# Patient Record
Sex: MALE | Race: WHITE | NOT HISPANIC OR LATINO | Employment: FULL TIME | URBAN - METROPOLITAN AREA
[De-identification: names, ages, dates, MRNs, and addresses within clinical notes are randomized per-mention and may not be internally consistent; named-entity substitution may affect disease eponyms.]

---

## 2021-10-22 ENCOUNTER — PATIENT ROUNDING (BHMG ONLY) (OUTPATIENT)
Dept: FAMILY MEDICINE CLINIC | Facility: CLINIC | Age: 36
End: 2021-10-22

## 2021-10-22 ENCOUNTER — OFFICE VISIT (OUTPATIENT)
Dept: FAMILY MEDICINE CLINIC | Facility: CLINIC | Age: 36
End: 2021-10-22

## 2021-10-22 VITALS
SYSTOLIC BLOOD PRESSURE: 156 MMHG | TEMPERATURE: 95.7 F | RESPIRATION RATE: 18 BRPM | DIASTOLIC BLOOD PRESSURE: 98 MMHG | HEIGHT: 72 IN | OXYGEN SATURATION: 99 % | HEART RATE: 98 BPM | BODY MASS INDEX: 42.66 KG/M2 | WEIGHT: 315 LBS

## 2021-10-22 DIAGNOSIS — Z79.899 MEDICATION MANAGEMENT: ICD-10-CM

## 2021-10-22 DIAGNOSIS — Z13.220 SCREENING FOR LIPOID DISORDERS: ICD-10-CM

## 2021-10-22 DIAGNOSIS — Z13.29 SCREENING FOR THYROID DISORDER: ICD-10-CM

## 2021-10-22 DIAGNOSIS — E66.01 MORBID OBESITY WITH BMI OF 45.0-49.9, ADULT (HCC): Primary | ICD-10-CM

## 2021-10-22 PROCEDURE — 99204 OFFICE O/P NEW MOD 45 MIN: CPT | Performed by: NURSE PRACTITIONER

## 2021-10-22 RX ORDER — LISINOPRIL 10 MG/1
10 TABLET ORAL DAILY
Qty: 30 TABLET | Refills: 1 | Status: SHIPPED | OUTPATIENT
Start: 2021-10-22

## 2021-10-22 RX ORDER — TRIAMCINOLONE ACETONIDE 0.25 MG/G
1 OINTMENT TOPICAL 2 TIMES DAILY
Qty: 15 G | Refills: 0 | Status: SHIPPED | OUTPATIENT
Start: 2021-10-22 | End: 2022-01-04

## 2021-10-22 NOTE — PROGRESS NOTES
"Chief Complaint  Establish Care, Weight Loss, Mass (abdomin area right side for a couple of years now), and Rash (spots on neck and shoulders)    Subjective          James Kemp presents to [facility]  James presents to [facility]. He is interested in blood work today.     Has a spot on his neck and his upper back  He does report a bump on his stomach    Weight loss, interested in options.     Weight Loss  Associated symptoms include a rash.   Rash        Objective   Vital Signs:   /98   Pulse 98   Temp 95.7 °F (35.4 °C) (Infrared)   Resp 18   Ht 182.9 cm (72\")   Wt (!) 161 kg (354 lb 1.6 oz)   SpO2 99%   BMI 48.02 kg/m²     Physical Exam  Vitals reviewed.   Constitutional:       Appearance: Normal appearance. He is obese.   HENT:      Head: Normocephalic and atraumatic.      Right Ear: Tympanic membrane and ear canal normal.      Left Ear: Tympanic membrane and ear canal normal.      Nose: Nose normal.      Mouth/Throat:      Mouth: Mucous membranes are moist.      Pharynx: Oropharynx is clear.   Eyes:      General:         Right eye: Discharge present.      Pupils: Pupils are equal, round, and reactive to light.   Cardiovascular:      Rate and Rhythm: Normal rate and regular rhythm.      Pulses: Normal pulses.      Heart sounds: Normal heart sounds. No murmur heard.  No friction rub. No gallop.    Pulmonary:      Effort: Pulmonary effort is normal. No respiratory distress.      Breath sounds: Normal breath sounds. No wheezing, rhonchi or rales.   Abdominal:      General: Bowel sounds are normal. There is no distension.      Palpations: Abdomen is soft. There is no mass.      Tenderness: There is no abdominal tenderness.      Hernia: A hernia is present.   Skin:     General: Skin is warm and dry.   Neurological:      Mental Status: He is alert and oriented to person, place, and time.   Psychiatric:         Mood and Affect: Mood normal.        Result Review :        "          Assessment and Plan    There are no diagnoses linked to this encounter.    Follow Up   No follow-ups on file.  Patient was given instructions and counseling regarding his condition or for health maintenance advice. Please see specific information pulled into the AVS if appropriate.     Patient is establishing care    Obesity: discussed weight loss options including medications, diet/exercise and surgical options. He has tried diet/exercise and feels he need something to help with weight loss to get back on track. It is causing strain in his relationship, he is currently  but hoping to reconcile.   Addendum: A1C is in prediabetic range, will try to get ozempic covered for once weekly injections to manage glucose as well as assist with weight, medication discussed extensively with patient during visit as well as risks. He is agreeable to proceed.     Hernia: not causing pain currently, recommend general surgery follow up for continued/worsening symptoms, only palpable with abdominal tension, not palpable with rest, he would like to monitor at this time    Weight: he is currently  and he is trying to work on himself to better manage his health. He feels at this point, he needs help to get on track to lose weight. He is unable to exercise much due to weight. He has two children and also wants to get healthy for them.     HTN: bp is elevaetd at 156/98, discussed sleep apnea, he has a machine and mask but states difficult to tolerate but he will get a new mask and start using this nightly, advised untreated sleep apnea can cause HTN and weight gain, as well as cardiac issues including enlarged heart and chf. He is agreeable to start low dose lisinopril, advised this can be discontinued with weight loss, but at his level, feel it is appropriate to start treatment currently, he agrees    Rash: triamcinolone prescribed, he does have suspicious lesions on his neck and back, recommend derm follow up,  he will self schedule    Follow up in 6 weeks, we can evaluate medication changes and repeat labs as needed, especially for weight management to ensure he is doing well on medication

## 2021-10-23 LAB
ALBUMIN SERPL-MCNC: 4.7 G/DL (ref 4–5)
ALBUMIN/GLOB SERPL: 1.4 {RATIO} (ref 1.2–2.2)
ALP SERPL-CCNC: 73 IU/L (ref 44–121)
ALT SERPL-CCNC: 65 IU/L (ref 0–44)
AST SERPL-CCNC: 47 IU/L (ref 0–40)
BASOPHILS # BLD AUTO: 0 X10E3/UL (ref 0–0.2)
BASOPHILS NFR BLD AUTO: 0 %
BILIRUB SERPL-MCNC: 0.5 MG/DL (ref 0–1.2)
BUN SERPL-MCNC: 18 MG/DL (ref 6–20)
BUN/CREAT SERPL: 21 (ref 9–20)
CALCIUM SERPL-MCNC: 9.9 MG/DL (ref 8.7–10.2)
CHLORIDE SERPL-SCNC: 101 MMOL/L (ref 96–106)
CHOLEST SERPL-MCNC: 191 MG/DL (ref 100–199)
CO2 SERPL-SCNC: 24 MMOL/L (ref 20–29)
CREAT SERPL-MCNC: 0.87 MG/DL (ref 0.76–1.27)
EOSINOPHIL # BLD AUTO: 0.1 X10E3/UL (ref 0–0.4)
EOSINOPHIL NFR BLD AUTO: 1 %
ERYTHROCYTE [DISTWIDTH] IN BLOOD BY AUTOMATED COUNT: 13.6 % (ref 11.6–15.4)
GLOBULIN SER CALC-MCNC: 3.3 G/DL (ref 1.5–4.5)
GLUCOSE SERPL-MCNC: 115 MG/DL (ref 65–99)
HBA1C MFR BLD: 6.1 % (ref 4.8–5.6)
HCT VFR BLD AUTO: 43 % (ref 37.5–51)
HDLC SERPL-MCNC: 43 MG/DL
HGB BLD-MCNC: 14 G/DL (ref 13–17.7)
IMM GRANULOCYTES # BLD AUTO: 0 X10E3/UL (ref 0–0.1)
IMM GRANULOCYTES NFR BLD AUTO: 0 %
LDLC SERPL CALC-MCNC: 122 MG/DL (ref 0–99)
LDLC/HDLC SERPL: 2.8 RATIO (ref 0–3.6)
LYMPHOCYTES # BLD AUTO: 1.4 X10E3/UL (ref 0.7–3.1)
LYMPHOCYTES NFR BLD AUTO: 12 %
MCH RBC QN AUTO: 27.3 PG (ref 26.6–33)
MCHC RBC AUTO-ENTMCNC: 32.6 G/DL (ref 31.5–35.7)
MCV RBC AUTO: 84 FL (ref 79–97)
MONOCYTES # BLD AUTO: 1.1 X10E3/UL (ref 0.1–0.9)
MONOCYTES NFR BLD AUTO: 9 %
NEUTROPHILS # BLD AUTO: 9.4 X10E3/UL (ref 1.4–7)
NEUTROPHILS NFR BLD AUTO: 78 %
PLATELET # BLD AUTO: 332 X10E3/UL (ref 150–450)
POTASSIUM SERPL-SCNC: 4.3 MMOL/L (ref 3.5–5.2)
PROT SERPL-MCNC: 8 G/DL (ref 6–8.5)
RBC # BLD AUTO: 5.13 X10E6/UL (ref 4.14–5.8)
SODIUM SERPL-SCNC: 140 MMOL/L (ref 134–144)
TRIGL SERPL-MCNC: 147 MG/DL (ref 0–149)
TSH SERPL DL<=0.005 MIU/L-ACNC: 1.42 UIU/ML (ref 0.45–4.5)
VLDLC SERPL CALC-MCNC: 26 MG/DL (ref 5–40)
WBC # BLD AUTO: 12.1 X10E3/UL (ref 3.4–10.8)

## 2021-10-23 RX ORDER — SEMAGLUTIDE 1.34 MG/ML
0.25 INJECTION, SOLUTION SUBCUTANEOUS WEEKLY
Qty: 1 PEN | Refills: 0 | Status: SHIPPED | OUTPATIENT
Start: 2021-10-23 | End: 2021-10-28

## 2021-10-26 ENCOUNTER — TELEPHONE (OUTPATIENT)
Dept: FAMILY MEDICINE CLINIC | Facility: CLINIC | Age: 36
End: 2021-10-26

## 2021-10-28 ENCOUNTER — TELEPHONE (OUTPATIENT)
Dept: FAMILY MEDICINE CLINIC | Facility: CLINIC | Age: 36
End: 2021-10-28

## 2021-10-28 NOTE — TELEPHONE ENCOUNTER
Spoke with pt and he agrees to trying the Saxenda. He would like to speak with you he has some questions to ask you, like side effects, WBC being high will the affect anything and etc. Pt would like for you to call him to go over these types of questions.

## 2021-10-28 NOTE — TELEPHONE ENCOUNTER
Spoke to patient, ozempic was not covered prediabetes. He is agreeable to trial saxenda. Discussed insurance may not cover this medication, and it is expensive. Rx sent, he will look online for  coupon to assist with cost. Needles sent. He does plan to follow up in 6 weeks regardless and to touch base regarding progress. He reports cutting out sweets from his diet.

## 2021-12-07 ENCOUNTER — OFFICE VISIT (OUTPATIENT)
Dept: FAMILY MEDICINE CLINIC | Facility: CLINIC | Age: 36
End: 2021-12-07

## 2021-12-07 VITALS
DIASTOLIC BLOOD PRESSURE: 94 MMHG | HEIGHT: 72 IN | HEART RATE: 102 BPM | SYSTOLIC BLOOD PRESSURE: 140 MMHG | WEIGHT: 315 LBS | RESPIRATION RATE: 18 BRPM | TEMPERATURE: 98.4 F | OXYGEN SATURATION: 97 % | BODY MASS INDEX: 42.66 KG/M2

## 2021-12-07 DIAGNOSIS — K42.9 PERIUMBILICAL HERNIA: Primary | ICD-10-CM

## 2021-12-07 DIAGNOSIS — I10 ESSENTIAL HYPERTENSION: ICD-10-CM

## 2021-12-07 DIAGNOSIS — E66.01 MORBID OBESITY WITH BMI OF 45.0-49.9, ADULT (HCC): ICD-10-CM

## 2021-12-07 PROCEDURE — 99213 OFFICE O/P EST LOW 20 MIN: CPT | Performed by: NURSE PRACTITIONER

## 2021-12-07 NOTE — PROGRESS NOTES
"Chief Complaint  Obesity (6wk FU)    Subjective          James Kemp presents to [facility]  Reported weight is 346 at home  Stopped snacking, stopped binge eating at night  Significantly reduce soda intake, now drinking more water, 2-3 x weekly splurges on soda  He was unable to start saxenda or victoza secondary to cost    Since his last visit, he has been using his cpap nightly and reports improvement in use, he is sleeping well throughout the night.     HTN: bp is improved, not controlled, he is working on diet/exercise, started using the medication daily but stopped after a few weeks.     Obesity  This is a chronic problem. The current episode started more than 1 year ago. The problem occurs constantly. The problem has been gradually improving. Associated symptoms include abdominal pain (related to hernia). Pertinent negatives include no anorexia, arthralgias, change in bowel habit, chest pain, chills, congestion, coughing, diaphoresis, fatigue, fever, headaches, joint swelling, myalgias, nausea, neck pain, numbness, rash, sore throat, swollen glands, urinary symptoms, vertigo, visual change, vomiting or weakness. Nothing aggravates the symptoms. Treatments tried: dietary changes, increased physical activity. The treatment provided moderate relief.       Objective   Vital Signs:   /94   Pulse 102   Temp 98.4 °F (36.9 °C) (Infrared)   Resp 18   Ht 182.9 cm (72\")   Wt (!) 159 kg (350 lb 1.6 oz)   SpO2 97%   BMI 47.48 kg/m²     Physical Exam  Constitutional:       General: He is not in acute distress.     Appearance: He is well-developed. He is not diaphoretic.   Cardiovascular:      Rate and Rhythm: Normal rate and regular rhythm.      Heart sounds: Normal heart sounds. No murmur heard.  No friction rub. No gallop.    Pulmonary:      Effort: Pulmonary effort is normal. No respiratory distress.      Breath sounds: Normal breath sounds. No wheezing or rales. "   Abdominal:      General: Bowel sounds are normal. There is no distension.      Palpations: Abdomen is soft.      Tenderness: There is no abdominal tenderness.      Hernia: A hernia (periumbilical) is present.   Musculoskeletal:      Cervical back: Neck supple.   Skin:     General: Skin is warm and dry.   Neurological:      Mental Status: He is alert and oriented to person, place, and time.        Result Review :                 Assessment and Plan    Diagnoses and all orders for this visit:    1. Periumbilical hernia (Primary)  -     Ambulatory Referral to General Surgery    2. Morbid obesity with BMI of 45.0-49.9, adult (HCC)    3. Essential hypertension        Follow Up   No follow-ups on file.  Patient was given instructions and counseling regarding his condition or for health maintenance advice. Please see specific information pulled into the AVS if appropriate.     Hernia; referral to general surgery, increasing pain in abdomen    Morbid obesity: continue dietary changes, discussed medication options beyond ozempic/saxenda, prefers to continue diet/exercise  Recommend follow up in 8-12 weeks to monitor progress. He is compliant with cpap which will likely give him more energy and has helped his bp    HTN: resume lisinopril,

## 2022-01-03 NOTE — PROGRESS NOTES
Date: 2022   Patient Name: James Kemp   Medical Record #: [id number]   : 1985  Age: 36 y.o.  Sex: male      Referring MD:  Radha Pina, APRN  [address]  [address]  [place] [address]    Reason for Visit  Chief Complaint   Patient presents with   • Hernia     Periumbilical hernia        History of Present Illness:     James Kemp is a 36 y.o. male who presents with a abdominal mass above the umbilicus.   Patient reports the mass is nonreducible, is often uncomfortable but not tender.  Denies recent growth.  Reports no exacerbation of discomfort with activity.  Quality of discomfort can be dull that does not radiate.  No prior history of abdominal surgeries  Patient has history of a pericolonic cyst that was drained by CT-guided in .  He had repeat follow-up CT scan a month after that show decrease of the size of the cyst.  He was supposed to have a follow-up CT scan but this never happened.  He denies any recent change of bowel habits.  He has lost 40 pounds due to exercise and improvement of diet.    Past Medical History  -Morbid obesity    Past Surgical History    Past Surgical History:   Procedure Laterality Date   • FINE NEEDLE ASPIRATION N/A     abdominen, reports pocket   • VASECTOMY N/A     Dr. Yan III   • WISDOM TOOTH EXTRACTION Bilateral        Allergies    No Known Allergies    Medications  Current Outpatient Medications   Medication Sig Dispense Refill   • lisinopril (PRINIVIL,ZESTRIL) 10 MG tablet Take 1 tablet by mouth Daily. (Patient taking differently: Take 10 mg by mouth Daily. Not Compliant) 30 tablet 1     No current facility-administered medications for this visit.         Social History  Social History     Socioeconomic History   • Marital status:    Tobacco Use   • Smoking status: Never Smoker   • Smokeless tobacco: Never Used   Vaping Use   • Vaping Use: Never used   Substance and Sexual Activity   • Alcohol use: Yes      "Alcohol/week: 2.0 standard drinks     Types: 2 Standard drinks or equivalent per week     Comment: social   • Drug use: No   • Sexual activity: Yes     Partners: Female     Comment: wife       Family History  Family History   Problem Relation Age of Onset   • Asthma Mother    • No Known Problems Father    • Heart disease Maternal Grandmother    • Multiple sclerosis Maternal Grandmother    • Heart disease Maternal Grandfather    • Brain cancer Maternal Aunt        REVIEW OF SYSTEMS  CONSTITUTIONAL: Denies fevers, chills, unintentional weight loss or weight gain  RESPIRATORY: Denies chronic cough or sob.   CARDIAC: Denies chest pain, palpitations, edema  GI: Denies dyspepsia, reflux, heartburn, nausea, vomiting, diarrhea or constipation  : Denies dysuria or hematuria.    MUSCULOSKELETAL: Denies muscle weakness and pain   NEURO: Denies chronic headaches.   ENDOCRINE: Denies significant heat or cold intolerance or history of thyroid problems.    DERM: no rashes,lesions or discharge.     Physical Examination  Ht 182.9 cm (72\")   Wt (!) 160 kg (352 lb 9.6 oz)   BMI 47.82 kg/m²   Body mass index is 47.82 kg/m².  GENERAL:alert, well appearing, and in no distress and oriented to person, place, and time  HEENT: normochephalic, atraumatic, moist mucus membranes, no scleral icterus   CHEST: clear to auscultation, no wheezes, rales or rhonchi, symmetric air entry  CARDIAC: regular rate and rhythm    ABDOMEN: soft, nontender, nondistended, no masses or organomegaly.  There is evidence of rectus muscle diastases.  I am not able to appreciate any hernia at the area of concern  EXTREMITIES: no cyanosis, clubbing or edema    NEURO: alert and oriented, normal speech, cranial nerves 2-12 grossly intact, no focal deficits  SKIN: Warm and moist    Medical Decision Making  Test Results:  Results for orders placed or performed in visit on 10/22/21   Lipid Panel With LDL / HDL Ratio    Specimen: Blood   Result Value Ref Range    Total " Cholesterol 191 100 - 199 mg/dL    Triglycerides 147 0 - 149 mg/dL    HDL Cholesterol 43 >39 mg/dL    VLDL Cholesterol Alfred 26 5 - 40 mg/dL    LDL Chol Calc (NIH) 122 (H) 0 - 99 mg/dL    LDL/HDL RATIO 2.8 0.0 - 3.6 ratio   Comprehensive metabolic panel    Specimen: Blood   Result Value Ref Range    Glucose 115 (H) 65 - 99 mg/dL    BUN 18 6 - 20 mg/dL    Creatinine 0.87 0.76 - 1.27 mg/dL    eGFR Non African Am 109 >59 mL/min/1.73    eGFR African Am 127 >59 mL/min/1.73    BUN/Creatinine Ratio 21 (H) 9 - 20    Sodium 140 134 - 144 mmol/L    Potassium 4.3 3.5 - 5.2 mmol/L    Chloride 101 96 - 106 mmol/L    Total CO2 24 20 - 29 mmol/L    Calcium 9.9 8.7 - 10.2 mg/dL    Total Protein 8.0 6.0 - 8.5 g/dL    Albumin 4.7 4.0 - 5.0 g/dL    Globulin 3.3 1.5 - 4.5 g/dL    A/G Ratio 1.4 1.2 - 2.2    Total Bilirubin 0.5 0.0 - 1.2 mg/dL    Alkaline Phosphatase 73 44 - 121 IU/L    AST (SGOT) 47 (H) 0 - 40 IU/L    ALT (SGPT) 65 (H) 0 - 44 IU/L   TSH Rfx On Abnormal To Free T4    Specimen: Blood   Result Value Ref Range    TSH 1.420 0.450 - 4.500 uIU/mL   Hemoglobin A1c    Specimen: Blood   Result Value Ref Range    Hemoglobin A1C 6.1 (H) 4.8 - 5.6 %   CBC & Differential    Specimen: Blood   Result Value Ref Range    WBC 12.1 (H) 3.4 - 10.8 x10E3/uL    RBC 5.13 4.14 - 5.80 x10E6/uL    Hemoglobin 14.0 13.0 - 17.7 g/dL    Hematocrit 43.0 37.5 - 51.0 %    MCV 84 79 - 97 fL    MCH 27.3 26.6 - 33.0 pg    MCHC 32.6 31.5 - 35.7 g/dL    RDW 13.6 11.6 - 15.4 %    Platelets 332 150 - 450 x10E3/uL    Neutrophil Rel % 78 Not Estab. %    Lymphocyte Rel % 12 Not Estab. %    Monocyte Rel % 9 Not Estab. %    Eosinophil Rel % 1 Not Estab. %    Basophil Rel % 0 Not Estab. %    Neutrophils Absolute 9.4 (H) 1.4 - 7.0 x10E3/uL    Lymphocytes Absolute 1.4 0.7 - 3.1 x10E3/uL    Monocytes Absolute 1.1 (H) 0.1 - 0.9 x10E3/uL    Eosinophils Absolute 0.1 0.0 - 0.4 x10E3/uL    Basophils Absolute 0.0 0.0 - 0.2 x10E3/uL    Immature Granulocyte Rel % 0 Not Estab. %     Immature Grans Absolute 0.0 0.0 - 0.1 x10E3/uL     CT scan abdomen pelvis October 31, 2016 there is near complete resolution.  Fluid collection measuring 2.5 cm.    Impression:  This is a 36 y.o. male patient with chief complaint of possible abdominal wall hernia.  On physical exam I am not able to feel any hernia although small hernias can be missed with his body habitus.  He has wide rectus muscle diastases and has high risk of developing epigastric hernias.  He has history of pericolonic cyst that was aspirated.  This was found to be benign.  Discussed with him about the need to have repeat CAT scan abdomen pelvis for further evaluation and ensure resolution.  He understood  He has been losing weight but continues to be morbidly obese.  Discussed with him about the need to continue weight loss with exercise and diet.    Plan:  1.  CT scan abdomen pelvis with IV and p.o. contrast  2.  Encourage weight loss, continue exercising and diet    All questions were answered and the patient was willing to proceed with the above recommendations.        Arslan Aguayo MD  General, Minimally Invasive and Endoscopic Surgery  [facility]    [address] [place] [address], [address]  [place], [place], [address]  P: [phone]  F: [phone]

## 2022-01-04 ENCOUNTER — OFFICE VISIT (OUTPATIENT)
Dept: SURGERY | Facility: CLINIC | Age: 37
End: 2022-01-04

## 2022-01-04 VITALS — WEIGHT: 315 LBS | HEIGHT: 72 IN | BODY MASS INDEX: 42.66 KG/M2

## 2022-01-04 DIAGNOSIS — K63.89 COLONIC MASS: ICD-10-CM

## 2022-01-04 DIAGNOSIS — K43.9 ABDOMINAL WALL HERNIA: Primary | ICD-10-CM

## 2022-01-04 PROCEDURE — 99203 OFFICE O/P NEW LOW 30 MIN: CPT | Performed by: SURGERY

## 2022-01-13 ENCOUNTER — HOSPITAL ENCOUNTER (OUTPATIENT)
Dept: CT IMAGING | Facility: HOSPITAL | Age: 37
Discharge: HOME | End: 2022-01-13
Admitting: SURGERY

## 2022-01-13 LAB — CREAT BLDA-MCNC: 0.9 MG/DL (ref 0.6–1.3)

## 2022-01-13 PROCEDURE — 74177 CT ABD & PELVIS W/CONTRAST: CPT

## 2022-01-13 PROCEDURE — 82565 ASSAY OF CREATININE: CPT

## 2022-01-13 PROCEDURE — 25010000002 IOPAMIDOL 61 % SOLUTION: Performed by: SURGERY

## 2022-01-13 PROCEDURE — 0 DIATRIZOATE MEGLUMINE & SODIUM PER 1 ML: Performed by: SURGERY

## 2022-01-13 RX ADMIN — DIATRIZOATE MEGLUMINE AND DIATRIZOATE SODIUM 30 ML: 660; 100 LIQUID ORAL; RECTAL at 14:41

## 2022-01-13 RX ADMIN — IOPAMIDOL 98 ML: 612 INJECTION, SOLUTION INTRAVENOUS at 15:37

## 2022-01-17 ENCOUNTER — TELEPHONE (OUTPATIENT)
Dept: SURGERY | Facility: CLINIC | Age: 37
End: 2022-01-17

## 2022-01-17 NOTE — TELEPHONE ENCOUNTER
Both with the patient about his CT scan results.  He has a small umbilical hernia and a small epigastric hernia.  There is no hernia on the side.  There prior seen cystic collection at the sigmoid colon is completely resolved.  Discussed with him about the need to continue to lose weight and follow-up in my office in 2 to 3 months for reevaluation of his hernia.  I discussed with him that his hernia may never become symptomatic or cause any problem but is usually recommended to try to fix hernias whenever they become symptomatic or larger.  He understood

## 2022-02-28 ENCOUNTER — TELEPHONE (OUTPATIENT)
Dept: FAMILY MEDICINE CLINIC | Facility: CLINIC | Age: 37
End: 2022-02-28

## 2022-02-28 NOTE — TELEPHONE ENCOUNTER
Caller: James Kemp    Relationship: Self    Best call back number: [phone]    Who are you requesting to speak with (clinical staff, provider,  specific staff member): OSITO KHAN     What was the call regarding: HAS QUESTIONS ABOUT WEIGHT LOSS MEDICATION WOULD LIKE TO DISCUSS     Do you require a callback: YES

## 2022-03-01 NOTE — TELEPHONE ENCOUNTER
WEIGHT LOSS MEDICATION, FDA APPROVED, COMMERCIAL ON TV, IF SUDEEP WONDERED IF YOU COULD PRESCRIBE PLENITY, I ADVISED HIM TO GO ON LINE AND FILL THE QUESTION OU, AND HE COULD ORDER IT ONLINE, AND NO PHARMACY WOULD HAVE IT HERE. HE HAS ASKED DO YOU SUGGEST ANYTHING ELSE? THE SHOT THAT IS FOR DIABETES IS 1200.00 AND HES PREDIABETIC SO INSURANCE DIDN'T APPROVE IT.

## 2022-03-01 NOTE — TELEPHONE ENCOUNTER
Contrave is a medication that helps with cravings, it is naltrexone and wellbutrin combined, can be expensive, may be able to get through online order cheaper than traditional pharmacy.    Phentermine is a stimulant they prescribe for weight loss, I do not prescribe that but he could see a weight loss clinic.    Injectables work really well if insurance will cover, however they  are very expensive.